# Patient Record
Sex: FEMALE | URBAN - METROPOLITAN AREA
[De-identification: names, ages, dates, MRNs, and addresses within clinical notes are randomized per-mention and may not be internally consistent; named-entity substitution may affect disease eponyms.]

---

## 2018-06-21 ENCOUNTER — OFFICE VISIT (OUTPATIENT)
Dept: URBAN - METROPOLITAN AREA CLINIC 48 | Facility: CLINIC | Age: 83
End: 2018-06-21
Payer: COMMERCIAL

## 2018-06-21 DIAGNOSIS — H25.12 AGE-RELATED NUCLEAR CATARACT, LEFT EYE: ICD-10-CM

## 2018-06-21 PROCEDURE — 92012 INTRM OPH EXAM EST PATIENT: CPT | Performed by: OPHTHALMOLOGY

## 2018-06-21 RX ORDER — LATANOPROST 50 UG/ML
0.005 % SOLUTION OPHTHALMIC
Qty: 1 | Refills: 11 | Status: INACTIVE
Start: 2018-06-21 | End: 2019-04-22

## 2018-06-21 ASSESSMENT — INTRAOCULAR PRESSURE
OS: 11
OD: 15

## 2018-06-21 NOTE — IMPRESSION/PLAN
Impression: Chronic angle-closure glaucoma, bilateral, severe stage: L26.9463. Plan: IOP borderline high today to right eye. On previous visit patient was to finish dorozlamide/timolol and then start timolol due to back order on dorzolamide. Patient is not to use Timolol at this time. Patient still has Dorzolamide-timolol gtts. Will send new [facility] for gtts today. PAtient to use Dorzolamide-timolol bid ou and latanoprost qhs os only. 

RTC
4 months IOP check

## 2018-06-21 NOTE — IMPRESSION/PLAN
Impression: Age-related nuclear cataract, left eye: H25.12. Plan: Patients daughter is to speak to patient and see if she would like cataract surgery done, may help improve vision some. Patient is hard of hearing.

## 2018-11-15 ENCOUNTER — OFFICE VISIT (OUTPATIENT)
Dept: URBAN - METROPOLITAN AREA CLINIC 48 | Facility: CLINIC | Age: 83
End: 2018-11-15
Payer: COMMERCIAL

## 2018-11-15 PROCEDURE — 92012 INTRM OPH EXAM EST PATIENT: CPT | Performed by: OPHTHALMOLOGY

## 2018-11-15 RX ORDER — TIMOLOL MALEATE 5 MG/ML
0.5 % SOLUTION/ DROPS OPHTHALMIC
Qty: 1 | Refills: 5 | Status: INACTIVE
Start: 2018-11-15 | End: 2019-04-22

## 2018-11-15 ASSESSMENT — INTRAOCULAR PRESSURE
OS: 12
OD: 13

## 2018-11-15 NOTE — IMPRESSION/PLAN
Impression: Chronic angle-closure glaucoma, bilateral, severe stage: M84.8109. Plan: IOP borderline high today to right eye. finish dorozlamide/timolol and then start timolol due to back order on dorzolamide and continue with latanoprost qhs os only. 

RTC
4 months IOP check

## 2019-04-22 ENCOUNTER — OFFICE VISIT (OUTPATIENT)
Dept: URBAN - METROPOLITAN AREA CLINIC 48 | Facility: CLINIC | Age: 84
End: 2019-04-22
Payer: MEDICARE

## 2019-04-22 PROCEDURE — 92012 INTRM OPH EXAM EST PATIENT: CPT | Performed by: OPHTHALMOLOGY

## 2019-04-22 RX ORDER — DORZOLAMIDE HYDROCHLORIDE AND TIMOLOL MALEATE 20; 5 MG/ML; MG/ML
SOLUTION/ DROPS OPHTHALMIC
Qty: 1 | Refills: 11 | Status: INACTIVE
Start: 2019-04-22 | End: 2019-04-22

## 2019-04-22 RX ORDER — LATANOPROST 50 UG/ML
0.005 % SOLUTION OPHTHALMIC
Qty: 1 | Refills: 11 | Status: INACTIVE
Start: 2019-04-22 | End: 2020-04-28

## 2019-04-22 ASSESSMENT — INTRAOCULAR PRESSURE
OD: 14
OS: 14

## 2019-04-22 NOTE — IMPRESSION/PLAN
Impression: Chronic angle-closure glaucoma, bilateral, severe stage: X63.5768. Plan: Discussed and reviewed diagnosis with patient today, understood by patient, intraocular pressure stable with medication. Continue medications and observe. Importance of compliance with medications and regular follow-up reiterated, will continue to monitor. Patient to continue dorz-chase bid os and roberth qhs os.

## 2019-04-22 NOTE — IMPRESSION/PLAN
Impression: Age-related nuclear cataract, left eye: H25.12. Plan: Patient defers surgical treatment.

## 2019-10-24 ENCOUNTER — OFFICE VISIT (OUTPATIENT)
Dept: URBAN - METROPOLITAN AREA CLINIC 48 | Facility: CLINIC | Age: 84
End: 2019-10-24
Payer: MEDICARE

## 2019-10-24 DIAGNOSIS — H40.2233 CHRONIC ANGLE-CLOSURE GLAUCOMA, BILATERAL, SEVERE STAGE: Primary | ICD-10-CM

## 2019-10-24 PROCEDURE — 92012 INTRM OPH EXAM EST PATIENT: CPT | Performed by: OPHTHALMOLOGY

## 2019-10-24 ASSESSMENT — INTRAOCULAR PRESSURE
OD: 12
OS: 13

## 2019-10-24 NOTE — IMPRESSION/PLAN
Impression: Chronic angle-closure glaucoma, bilateral, severe stage: P53.7090. Plan: IOP stable, well controlled. Timolol bid os and Latanorpost qhs os.

## 2020-04-28 ENCOUNTER — OFFICE VISIT (OUTPATIENT)
Dept: URBAN - METROPOLITAN AREA CLINIC 48 | Facility: CLINIC | Age: 85
End: 2020-04-28
Payer: MEDICARE

## 2020-04-28 PROCEDURE — 92012 INTRM OPH EXAM EST PATIENT: CPT | Performed by: OPHTHALMOLOGY

## 2020-04-28 RX ORDER — TIMOLOL MALEATE 5 MG/ML
0.5 % SOLUTION/ DROPS OPHTHALMIC
Qty: 10 | Refills: 12 | Status: INACTIVE
Start: 2020-04-28 | End: 2021-01-01

## 2020-04-28 RX ORDER — LATANOPROST 50 UG/ML
0.005 % SOLUTION OPHTHALMIC
Qty: 2.5 | Refills: 12 | Status: INACTIVE
Start: 2020-04-28 | End: 2021-01-01

## 2020-04-28 ASSESSMENT — INTRAOCULAR PRESSURE
OS: 11
OD: 13

## 2020-04-28 NOTE — IMPRESSION/PLAN
Impression: Chronic angle-closure glaucoma, bilateral, severe stage: I01.1219. Plan: Discussed and reviewed diagnosis with patient today, understood by patient, intraocular pressure stable with medication. Continue medications and observe. Importance of compliance with medications and regular follow-up reiterated, will continue to monitor. Patient to continue; Timolol bid os and Latanoprost qhs os.

## 2020-07-20 ENCOUNTER — OFFICE VISIT (OUTPATIENT)
Dept: URBAN - METROPOLITAN AREA CLINIC 48 | Facility: CLINIC | Age: 85
End: 2020-07-20
Payer: MEDICARE

## 2020-07-20 DIAGNOSIS — E11.9 TYPE 2 DIABETES MELLITUS W/O COMPLICATION: Primary | ICD-10-CM

## 2020-07-20 PROCEDURE — 92004 COMPRE OPH EXAM NEW PT 1/>: CPT | Performed by: OPTOMETRIST

## 2020-07-20 ASSESSMENT — INTRAOCULAR PRESSURE
OD: 14
OS: 14

## 2020-07-20 NOTE — IMPRESSION/PLAN
Impression: Type 2 diabetes mellitus w/o complication: M98.6. Plan: Discussed with patient the importance of glucose control and ocular risk. 

Follow up annually with dilated fundus exam.

## 2020-07-20 NOTE — IMPRESSION/PLAN
Impression: Chronic angle-closure glaucoma, bilateral, severe stage: R52.5473. Plan: Patient to continue Timolol bid os and Latanoprost qhs os. 

RTC 9 months IOP with Dr. Aniceto Love

## 2021-01-01 ENCOUNTER — OFFICE VISIT (OUTPATIENT)
Dept: URBAN - METROPOLITAN AREA CLINIC 48 | Facility: CLINIC | Age: 86
End: 2021-01-01
Payer: MEDICARE

## 2021-01-01 PROCEDURE — 99213 OFFICE O/P EST LOW 20 MIN: CPT | Performed by: OPHTHALMOLOGY

## 2021-01-01 RX ORDER — LATANOPROST 50 UG/ML
0.005 % SOLUTION OPHTHALMIC
Qty: 5 | Refills: 6 | Status: ACTIVE
Start: 2021-01-01

## 2021-01-01 ASSESSMENT — INTRAOCULAR PRESSURE
OS: 11
OD: 14
OS: 12
OD: 13

## 2021-03-23 NOTE — IMPRESSION/PLAN
Impression: Chronic angle-closure glaucoma, bilateral, severe stage: E44.2807. Plan: IOP well controlled OU. At this time, will d/c Timolol as a trial and see if IOP remains low. 
Patient to continue Latanoprost QHS OS

## 2021-09-23 NOTE — IMPRESSION/PLAN
Impression: Chronic angle-closure glaucoma, bilateral, severe stage: E90.9509. Plan: IOP within good range OU. 
Continue Latanoprost QHS OS